# Patient Record
Sex: FEMALE | Race: BLACK OR AFRICAN AMERICAN | Employment: STUDENT | URBAN - METROPOLITAN AREA
[De-identification: names, ages, dates, MRNs, and addresses within clinical notes are randomized per-mention and may not be internally consistent; named-entity substitution may affect disease eponyms.]

---

## 2017-03-24 DIAGNOSIS — N94.6 DYSMENORRHEA: ICD-10-CM

## 2017-03-24 RX ORDER — NORETHINDRONE ACETATE AND ETHINYL ESTRADIOL .02; 1 MG/1; MG/1
1 TABLET ORAL DAILY
Qty: 84 TABLET | Refills: 0 | Status: SHIPPED | OUTPATIENT
Start: 2017-03-24 | End: 2017-08-07

## 2017-03-24 NOTE — TELEPHONE ENCOUNTER
[facility] calling for med refill of Microgestin.  Looks like changed primary to [facility]?  One refill given and note needs yearly visit prior to refills.  Alejandra Wharton RN

## 2017-03-25 NOTE — TELEPHONE ENCOUNTER
Pharmacy calling for refill.  Due for visit prior to further refills.  Note on RX.  Alejandra Wharton RN

## 2017-07-03 ENCOUNTER — OFFICE VISIT (OUTPATIENT)
Dept: FAMILY MEDICINE | Facility: CLINIC | Age: 22
End: 2017-07-03

## 2017-07-03 VITALS
HEIGHT: 67 IN | SYSTOLIC BLOOD PRESSURE: 100 MMHG | DIASTOLIC BLOOD PRESSURE: 70 MMHG | WEIGHT: 147.6 LBS | OXYGEN SATURATION: 98 % | BODY MASS INDEX: 23.17 KG/M2 | HEART RATE: 89 BPM | TEMPERATURE: 98.4 F

## 2017-07-03 DIAGNOSIS — N89.8 VAGINAL ITCHING: Primary | ICD-10-CM

## 2017-07-03 DIAGNOSIS — Z71.89 ACP (ADVANCE CARE PLANNING): ICD-10-CM

## 2017-07-03 LAB
BACTERIA URINE: ABNORMAL
CLUE CELLS: ABNORMAL
PH BLDA: 5 [PH] (ref 5–7)
PMNS (WET PREP): ABNORMAL
TRICHOMONAS VAGINALS: ABNORMAL
YEAST CELLS: ABNORMAL

## 2017-07-03 PROCEDURE — 99213 OFFICE O/P EST LOW 20 MIN: CPT | Performed by: PHYSICIAN ASSISTANT

## 2017-07-03 PROCEDURE — 87210 SMEAR WET MOUNT SALINE/INK: CPT | Performed by: PHYSICIAN ASSISTANT

## 2017-07-03 PROCEDURE — 87491 CHLMYD TRACH DNA AMP PROBE: CPT | Mod: 90 | Performed by: PHYSICIAN ASSISTANT

## 2017-07-03 PROCEDURE — 87591 N.GONORRHOEAE DNA AMP PROB: CPT | Mod: 90 | Performed by: PHYSICIAN ASSISTANT

## 2017-07-03 NOTE — MR AVS SNAPSHOT
"              After Visit Summary   7/3/2017    Harriet Valencia    MRN: [id number]           Patient Information     Date Of Birth          1995        Visit Information        Provider Department      7/3/2017 2:30 PM Yadi Hook PA-C [facility]        Today's Diagnoses     Vaginal itching    -  1    ACP (advance care planning)           Follow-ups after your visit        Follow-up notes from your care team     Return if symptoms worsen or fail to improve.      Who to contact     If you have questions or need follow up information about today's clinic visit or your schedule please contact [facility] directly at [phone].  Normal or non-critical lab and imaging results will be communicated to you by MyChart, letter or phone within 4 business days after the clinic has received the results. If you do not hear from us within 7 days, please contact the clinic through Payvmenthart or phone. If you have a critical or abnormal lab result, we will notify you by phone as soon as possible.  Submit refill requests through [employer] or call your pharmacy and they will forward the refill request to us. Please allow 3 business days for your refill to be completed.          Additional Information About Your Visit        MyChart Information     [employer] gives you secure access to your electronic health record. If you see a primary care provider, you can also send messages to your care team and make appointments. If you have questions, please call your primary care clinic.  If you do not have a primary care provider, please call [phone] and they will assist you.        Care EveryWhere ID     This is your Care EveryWhere ID. This could be used by other organizations to access your [place] medical records  [id number]        Your Vitals Were     Pulse Temperature Height Last Period Pulse Oximetry Breastfeeding?    89 98.4  F (36.9  C) (Oral) 1.689 m (5' 6.5\") 06/12/2017 " 98% No    BMI (Body Mass Index)                   23.47 kg/m2            Blood Pressure from Last 3 Encounters:   07/03/17 100/70   08/01/16 110/70   06/25/16 102/60    Weight from Last 3 Encounters:   07/03/17 67 kg (147 lb 9.6 oz)   08/01/16 64 kg (141 lb)   06/25/16 65.2 kg (143 lb 12.8 oz)              We Performed the Following     A WET PREP (BFP)     Chlam Trach/N. Gonorrhoeae RNA TMA([facility]        Primary Care Provider Office Phone # Fax #    SOTO Soares [phone]       [facility]  [address]  [facility] [address]        Equal Access to Services     JENNA CHILDERS : Hadii renata randall hadasho Soomaali, waaxda luqadaha, qaybta kaalmada adeegyada, darvin san . So [facility] [place] [phone].    ATENCIÓN: Si habla español, tiene a amador disposición servicios gratuitos de asistencia lingüística. Llame al [phone].    We comply with applicable federal civil rights laws and [place] laws. We do not discriminate on the basis of race, color, national origin, age, disability sex, sexual orientation or gender identity.            Thank you!     Thank you for choosing [facility]  for your care. Our goal is always to provide you with excellent care. Hearing back from our patients is one way we can continue to improve our services. Please take a few minutes to complete the written survey that you may receive in the mail after your visit with us. Thank you!             Your Updated Medication List - Protect others around you: Learn how to safely use, store and throw away your medicines at www.disposemymeds.org.          This list is accurate as of: 7/3/17  4:10 PM.  Always use your most recent med list.                   Brand Name Dispense Instructions for use Diagnosis    norethindrone-ethinyl estradiol 1-20 MG-MCG per tablet    MICROGESTIN 1/20    84 tablet    Take 1 tablet by mouth daily 3/24/17-Yearly visit before next refill     Dysmenorrhea

## 2017-07-03 NOTE — NURSING NOTE
Harriet is here for yeast inf    Pre-Visit Screening :  Immunizations : up to date  Colonoscopy : NA  Mammogram : NA  Asthma Action Test/Plan : NA  PHQ9/GAD7 :  NA  Pulse - regular  My Chart - accepts    CLASSIFICATION OF OVERWEIGHT AND OBESITY BY BMI                         Obesity Class           BMI(kg/m2)  Underweight                                    < 18.5  Normal                                         18.5-24.9  Overweight                                     25.0-29.9  OBESITY                     I                  30.0-34.9                              II                 35.0-39.9  EXTREME OBESITY             III                >40                             Patient's  BMI Body mass index is 23.47 kg/(m^2).  http://hin.nhlbi.nih.gov/menuplanner/menu.cgi  Questioned patient about current smoking habits.  Pt. has never smoked.

## 2017-07-03 NOTE — PROGRESS NOTES
"CC: Vaginal Symptoms    History:  Harriet is here today with concerns over several recent yeast infections.    She first had an episode 1 year ago after a course of penicillin, which responded well to Monistat. Unfortunately, she had another episode this past May, which also responded well to Monistat. Now, just in the past 4-5 days, Harriet developed her typical symptoms again where she had white discharge and vaginal itching. She used another Monistat 1 day treatment, which helped at first, but now this morning had another episode of itching and discharge. She is feeling okay right now, but is worried treatment was inadequate.    Harriet has not had this evaluated yet. Would like to know what is causing it. Has had new sexual partners since her last STD check.    PMH, MEDICATIONS, ALLERGIES, SOCIAL AND FAMILY HISTORY in EPIC and reviewed by me personally.      ROS negative other than the symptoms noted above in the HPI.        Examination   /70 (BP Location: Right arm, Patient Position: Chair, Cuff Size: Adult Regular)  Pulse 89  Temp 98.4  F (36.9  C) (Oral)  Ht 1.689 m (5' 6.5\")  Wt 67 kg (147 lb 9.6 oz)  LMP 06/12/2017  SpO2 98%  Breastfeeding? No  BMI 23.47 kg/m2       Constitutional: Sitting comfortably, in no acute distress. Vital signs noted  Cardiovascular:  regular rate and rhythm, no murmurs, clicks, or gallops  Respiratory:  normal respiratory rate and rhythm, lungs clear to auscultation  : External genitalia without abnormalities. Vaginal canal with moderate white discharge. Cervix intact without abnormalities. No abnormalities on bimanual examination.   SKIN: No jaundice/pallor/rash.   Psychiatric: mentation appears normal and affect normal/bright        A/P    ICD-10-CM    1. Vaginal itching L29.8 A WET PREP (BFP)     Chlam Trach/N. Gonorrhoeae RNA TMA([facility]   2. ACP (advance care planning) Z71.89        DISCUSSION: Wet prep shows bacteria, but no clue cells, and no yeast. Will have her " leave dirty urine sample to check for G/C, and will contact her with result once available. I would like for her to monitor her symptoms, and she may have had a yeast infection that she treated with Monistat, and now there is no yeast on sample. Recommended avoiding excess sugar, wearing cotton underwear, and breathable fabrics, showering immediately after working out. I will contact with further recommendations once results return.    follow up visit: As needed    Yadi Hook PA-C  [facility]

## 2017-07-05 ENCOUNTER — MYC MEDICAL ADVICE (OUTPATIENT)
Dept: FAMILY MEDICINE | Facility: CLINIC | Age: 22
End: 2017-07-05

## 2017-07-05 LAB
CHLAMYDIA TRACHOMATIS RNA, TMA - QUEST: NOT DETECTED
NEISSERIA GONORRHOEAE RNA TMA: NOT DETECTED
SEE NOTE - QUEST: NORMAL

## 2017-07-06 NOTE — TELEPHONE ENCOUNTER
From: Harriet Valencia  To: Yadi Hook PA-C  Sent: 7/5/2017 10:58 PM CDT  Subject: Updates about my health    I haven't had any further symptoms since the morning of the visit. Like you said maybe it could've been because of the infection I had treated a couple days prior.

## 2017-07-07 ENCOUNTER — MYC MEDICAL ADVICE (OUTPATIENT)
Dept: FAMILY MEDICINE | Facility: CLINIC | Age: 22
End: 2017-07-07

## 2017-08-07 DIAGNOSIS — N94.6 DYSMENORRHEA: ICD-10-CM

## 2017-08-07 RX ORDER — NORETHINDRONE ACETATE AND ETHINYL ESTRADIOL .02; 1 MG/1; MG/1
1 TABLET ORAL DAILY
Qty: 28 TABLET | Refills: 0 | Status: SHIPPED | OUTPATIENT
Start: 2017-08-07 | End: 2017-08-14

## 2017-08-07 NOTE — TELEPHONE ENCOUNTER
Harriet Valencia is requesting a refill of:    Pending Prescriptions:                       Disp   Refills    norethindrone-ethinyl estradiol (MICROGES*28 tab*0            Sig: Take 1 tablet by mouth daily    Pt has OV on 8/14/17 with you.  Please advise

## 2017-08-14 ENCOUNTER — OFFICE VISIT (OUTPATIENT)
Dept: FAMILY MEDICINE | Facility: CLINIC | Age: 22
End: 2017-08-14

## 2017-08-14 VITALS
DIASTOLIC BLOOD PRESSURE: 60 MMHG | TEMPERATURE: 98.5 F | OXYGEN SATURATION: 99 % | HEIGHT: 66 IN | HEART RATE: 83 BPM | BODY MASS INDEX: 23.78 KG/M2 | WEIGHT: 148 LBS | SYSTOLIC BLOOD PRESSURE: 98 MMHG

## 2017-08-14 DIAGNOSIS — Z30.41 SURVEILLANCE OF PREVIOUSLY PRESCRIBED CONTRACEPTIVE PILL: ICD-10-CM

## 2017-08-14 DIAGNOSIS — Z01.419 ENCOUNTER FOR GYNECOLOGICAL EXAMINATION WITHOUT ABNORMAL FINDING: Primary | ICD-10-CM

## 2017-08-14 DIAGNOSIS — Z23 NEED FOR TDAP VACCINATION: ICD-10-CM

## 2017-08-14 LAB — HEMOGLOBIN: 13.6 G/DL (ref 11.7–15.7)

## 2017-08-14 PROCEDURE — 85018 HEMOGLOBIN: CPT | Performed by: PHYSICIAN ASSISTANT

## 2017-08-14 PROCEDURE — 36415 COLL VENOUS BLD VENIPUNCTURE: CPT | Performed by: PHYSICIAN ASSISTANT

## 2017-08-14 PROCEDURE — 99395 PREV VISIT EST AGE 18-39: CPT | Mod: 25 | Performed by: PHYSICIAN ASSISTANT

## 2017-08-14 PROCEDURE — 90471 IMMUNIZATION ADMIN: CPT | Performed by: PHYSICIAN ASSISTANT

## 2017-08-14 PROCEDURE — 90715 TDAP VACCINE 7 YRS/> IM: CPT | Performed by: PHYSICIAN ASSISTANT

## 2017-08-14 RX ORDER — NORETHINDRONE ACETATE AND ETHINYL ESTRADIOL .02; 1 MG/1; MG/1
1 TABLET ORAL DAILY
Qty: 84 TABLET | Refills: 3 | Status: SHIPPED | OUTPATIENT
Start: 2017-08-14 | End: 2018-10-29

## 2017-08-14 NOTE — NURSING NOTE
"Harriet Valencia is here for a CPX. Non- Fasting: .    Pre-visit Planning  Immunizations -not up to date - Tdap  Colonoscopy -NA  Mammogram -NA  Asthma test --NA  PHQ2 -is completed today  GIAN 7 -NA  Fall Risk Assessment -NA  CAGE: completed today  Vitals:  BP Cuff right  Arm with regular Cuff  PULSE regular  148 lbs 0 oz and 5' 6.25\"  CLASSIFICATION OF OVERWEIGHT AND OBESITY BY BMI                        Obesity Class           BMI(kg/m2)  Underweight                                    < 18.5  Normal                                         18.5-24.9  Overweight                                     25.0-29.9  OBESITY                     I                  30.0-34.9                             II                 35.0-39.9  EXTREME OBESITY             III                >40      Patient's  BMI Body mass index is 23.71 kg/(m^2).  Http://hin.nhlbi.nih.gov/menuplanner/menu.cgi      Roomed By: VALERIE Moore ([facility])    "

## 2017-08-14 NOTE — PROGRESS NOTES
SUBJECTIVE:     CC: Harriet Valencia is an 21 year old woman who presents for preventive health visit.     Healthy Habits:      Amount of exercise or daily activities, outside of work: gym daily    Problems taking medications regularly No    Medication side effects: No    Have you had an eye exam in the pasLt two years? yes    Do you see a dentist twice per year? yes      OCP use:   Do you or anyone in your family have a history of blood clots? No  Do you have a history of migraine with aura?  No  Do you smoke?  No  Do you have a history of hypertension?  No          Today's PHQ-2 Score:   PHQ-2 ( 1999 Pfizer) 8/14/2017 5/12/2016   Q1: Little interest or pleasure in doing things 0 0   Q2: Feeling down, depressed or hopeless 0 0   PHQ-2 Score 0 0         Abuse: Current or Past(Physical, Sexual or Emotional)- No  Do you feel safe in your environment - Yes  Social History   Substance Use Topics     Smoking status: Never Smoker     Smokeless tobacco: Never Used     Alcohol use Yes      Comment: rare     The patient does not drink >3 drinks per day nor >7 drinks per week.    No results for input(s): CHOL, HDL, LDL, TRIG, CHOLHDLRATIO, NHDL in the last 30080 hours.      Reviewed orders with patient.  Reviewed health maintenance and updated orders accordingly - Yes  Labs reviewed in EPIC  BP Readings from Last 3 Encounters:   08/14/17 98/60   07/03/17 100/70   08/01/16 110/70    Wt Readings from Last 3 Encounters:   08/14/17 67.1 kg (148 lb)   07/03/17 67 kg (147 lb 9.6 oz)   08/01/16 64 kg (141 lb)                  Patient Active Problem List   Diagnosis     Health Care Home     ACP (advance care planning)     Past Surgical History:   Procedure Laterality Date     NO HISTORY OF SURGERY         Social History   Substance Use Topics     Smoking status: Never Smoker     Smokeless tobacco: Never Used     Alcohol use Yes      Comment: rare     Family History   Problem Relation Age of Onset     Family History Negative Mother       Family History Negative Father      DIABETES Paternal Grandmother      DIABETES Paternal Grandfather      Family History Negative Brother      CANCER No family hx of      Breast Cancer No family hx of      Colon Cancer No family hx of          Current Outpatient Prescriptions   Medication Sig Dispense Refill     norethindrone-ethinyl estradiol (MICROGESTIN 1/20) 1-20 MG-MCG per tablet Take 1 tablet by mouth daily 84 tablet 3     [DISCONTINUED] norethindrone-ethinyl estradiol (MICROGESTIN 1/20) 1-20 MG-MCG per tablet Take 1 tablet by mouth daily 28 tablet 0     Allergies   Allergen Reactions     No Known Allergies                Mammo Decision Support:  Mammogram not appropriate for this patient based on age.    Pertinent mammograms are reviewed under the imaging tab.  History of abnormal Pap smear: NO - age 21-29 PAP every 3 years recommended  All Histories reviewed and updated in Epic.  Past Medical History:   Diagnosis Date     NO ACTIVE PROBLEMS       Past Surgical History:   Procedure Laterality Date     NO HISTORY OF SURGERY         ROS:  C: NEGATIVE for fever, chills, change in weight  I: NEGATIVE for worrisome rashes, moles or lesions  E: NEGATIVE for vision changes or irritation  ENT: NEGATIVE for ear, mouth and throat problems  R: NEGATIVE for significant cough or SOB  B: NEGATIVE for masses, tenderness or discharge  CV: NEGATIVE for chest pain, palpitations or peripheral edema  GI: NEGATIVE for nausea, abdominal pain, heartburn, or change in bowel habits  : NEGATIVE for unusual urinary or vaginal symptoms. Periods are regular.   M: NEGATIVE for significant arthralgias or myalgia  N: NEGATIVE for weakness, dizziness or paresthesias  P: NEGATIVE for changes in mood or affect    Problem list, Medication list, Allergies, and Medical/Social/Surgical histories reviewed in [facility] and updated as appropriate.  Labs reviewed in EPIC  BP Readings from Last 3 Encounters:   08/14/17 98/60   07/03/17 100/70   08/01/16  "110/70    Wt Readings from Last 3 Encounters:   08/14/17 67.1 kg (148 lb)   07/03/17 67 kg (147 lb 9.6 oz)   08/01/16 64 kg (141 lb)                  Patient Active Problem List   Diagnosis     Health Care Home     ACP (advance care planning)     Past Surgical History:   Procedure Laterality Date     NO HISTORY OF SURGERY         Social History   Substance Use Topics     Smoking status: Never Smoker     Smokeless tobacco: Never Used     Alcohol use Yes      Comment: rare     Family History   Problem Relation Age of Onset     Family History Negative Mother      Family History Negative Father      DIABETES Paternal Grandmother      DIABETES Paternal Grandfather      Family History Negative Brother      CANCER No family hx of      Breast Cancer No family hx of      Colon Cancer No family hx of          Current Outpatient Prescriptions   Medication Sig Dispense Refill     norethindrone-ethinyl estradiol (MICROGESTIN 1/20) 1-20 MG-MCG per tablet Take 1 tablet by mouth daily 84 tablet 3     [DISCONTINUED] norethindrone-ethinyl estradiol (MICROGESTIN 1/20) 1-20 MG-MCG per tablet Take 1 tablet by mouth daily 28 tablet 0     Allergies   Allergen Reactions     No Known Allergies      No lab results found.   OBJECTIVE:     BP 98/60 (BP Location: Right arm, Patient Position: Chair, Cuff Size: Adult Regular)  Pulse 83  Temp 98.5  F (36.9  C) (Oral)  Ht 1.683 m (5' 6.25\")  Wt 67.1 kg (148 lb)  LMP 07/17/2017  SpO2 99%  Breastfeeding? No  BMI 23.71 kg/m2  EXAM:  GENERAL: healthy, alert and no distress  EYES: Eyes grossly normal to inspection, PERRL and conjunctivae and sclerae normal  HENT: ear canals and TM's normal, nose and mouth without ulcers or lesions  NECK: no adenopathy, no asymmetry, masses, or scars and thyroid normal to palpation  RESP: lungs clear to auscultation - no rales, rhonchi or wheezes  BREAST: normal without masses, tenderness or nipple discharge and no palpable axillary masses or adenopathy  CV: " "regular rate and rhythm, normal S1 S2, no S3 or S4, no murmur, click or rub, no peripheral edema and peripheral pulses strong  ABDOMEN: soft, nontender, no hepatosplenomegaly, no masses and bowel sounds normal   (female): normal female external genitalia, normal urethral meatus, vaginal mucosa pink, moist, well rugated, and normal cervix/adnexa/uterus without masses or discharge   MS: no gross musculoskeletal defects noted, no edema  SKIN: no suspicious lesions or rashes  NEURO: Normal strength and tone, mentation intact and speech normal  PSYCH: mentation appears normal, affect normal/bright    ASSESSMENT/PLAN:         ICD-10-CM    1. Encounter for gynecological examination without abnormal finding Z01.419 VENOUS COLLECTION     HEMOGLOBIN   2. Surveillance of previously prescribed contraceptive pill Z30.41 norethindrone-ethinyl estradiol (MICROGESTIN 1/20) 1-20 MG-MCG per tablet   3. Need for Tdap vaccination Z23        COUNSELING:     Special attention given to:        Regular exercise       Healthy diet/nutrition       Vision screening       Hearing screening       Immunizations    Vaccinated for: TDAP                   Safe sex practices/STD prevention      Blood Pressure.  BP Readings from Last 6 Encounters:   08/14/17 98/60   07/03/17 100/70   08/01/16 110/70   06/25/16 102/60   05/12/16 100/60   12/29/15 114/60            reports that she has never smoked. She has never used smokeless tobacco.    Estimated body mass index is 23.71 kg/(m^2) as calculated from the following:    Height as of this encounter: 1.683 m (5' 6.25\").    Weight as of this encounter: 67.1 kg (148 lb).         Counseling Resources:  ATP IV Guidelines  Pooled Cohorts Equation Calculator  Breast Cancer Risk Calculator  FRAX Risk Assessment  ICSI Preventive Guidelines  Dietary Guidelines for Americans, 2010  USDA's MyPlate  ASA Prophylaxis  Lung CA Screening    SOTO Soares  [facility]  "

## 2017-08-14 NOTE — MR AVS SNAPSHOT
After Visit Summary   8/14/2017    Harriet Valencia    MRN: [id number]           Patient Information     Date Of Birth          1995        Visit Information        Provider Department      8/14/2017 11:30 AM Roxanne Alcantar PA [facility]        Today's Diagnoses     Encounter for gynecological examination without abnormal finding    -  1    Surveillance of previously prescribed contraceptive pill        Need for Tdap vaccination           Follow-ups after your visit        Who to contact     If you have questions or need follow up information about today's clinic visit or your schedule please contact [facility] directly at [phone].  Normal or non-critical lab and imaging results will be communicated to you by MyChart, letter or phone within 4 business days after the clinic has received the results. If you do not hear from us within 7 days, please contact the clinic through Biomemehart or phone. If you have a critical or abnormal lab result, we will notify you by phone as soon as possible.  Submit refill requests through [email or web address] or call your pharmacy and they will forward the refill request to us. Please allow 3 business days for your refill to be completed.          Additional Information About Your Visit        MyChart Information     [email or web address] gives you secure access to your electronic health record. If you see a primary care provider, you can also send messages to your care team and make appointments. If you have questions, please call your primary care clinic.  If you do not have a primary care provider, please call [phone] and they will assist you.        Care EveryWhere ID     This is your Care EveryWhere ID. This could be used by other organizations to access your [place] medical records  [id number]        Your Vitals Were     Pulse Temperature Height Last Period Pulse Oximetry Breastfeeding?    83 98.5  F (36.9  C) (Oral)  "1.683 m (5' 6.25\") 07/17/2017 99% No    BMI (Body Mass Index)                   23.71 kg/m2            Blood Pressure from Last 3 Encounters:   08/14/17 98/60   07/03/17 100/70   08/01/16 110/70    Weight from Last 3 Encounters:   08/14/17 67.1 kg (148 lb)   07/03/17 67 kg (147 lb 9.6 oz)   08/01/16 64 kg (141 lb)              We Performed the Following     HEMOGLOBIN     TDAP VACCINE (BOOSTRIX)     VACCINE ADMINISTRATION, INITIAL     VENOUS COLLECTION          Where to get your medicines      [facility] [address] [place] [address] [id number] [place] [address] AT [place] [address]  [id number] [place] [id number]    Hours:  24-hours Phone:  [phone]     norethindrone-ethinyl estradiol 1-20 MG-MCG per tablet          Primary Care Provider Office Phone # Fax #    SOTO Soares [phone] [address] [facility] [address]        Equal Access to Services     [facility] : Hadii renata randall hadjeniseo Sodunia, waaxda luqadaha, qaybta kaalmada adelopezyada, darvin san . So [facility] [phone].    ATENCIÓN: Si habla español, tiene a amador disposición servicios gratuitos de asistencia lingüística. LlSt. [facility] Hospital [phone].    We comply with applicable federal civil rights laws and [place] laws. We do not discriminate on the basis of race, color, national origin, age, disability sex, sexual orientation or gender identity.            Thank you!     Thank you for choosing [facility]  for your care. Our goal is always to provide you with excellent care. Hearing back from our patients is one way we can continue to improve our services. Please take a few minutes to complete the written survey that you may receive in the mail after your visit with us. Thank you!             Your Updated Medication List - Protect others around you: Learn how to safely use, store and throw away your medicines at " www.disposemymeds.org.          This list is accurate as of: 8/14/17  1:30 PM.  Always use your most recent med list.                   Brand Name Dispense Instructions for use Diagnosis    norethindrone-ethinyl estradiol 1-20 MG-MCG per tablet    MICROGESTIN 1/20    84 tablet    Take 1 tablet by mouth daily    Surveillance of previously prescribed contraceptive pill

## 2017-09-05 ENCOUNTER — TELEPHONE (OUTPATIENT)
Dept: FAMILY MEDICINE | Facility: CLINIC | Age: 22
End: 2017-09-05

## 2017-09-05 NOTE — TELEPHONE ENCOUNTER
Pt called the CS line with a  Questions about her Birth control medication.   She wanted to make sure she has a full year prescription because when she went to  her medication they only gave her 1 month.   Called the pharmacy and they do have her prescription for 1 full her on profile.     Called Harriet and let her know that she does have a full year prescription at her current pharmacy.     Lorri.VALERIE Rios ([facility])

## 2018-11-21 NOTE — PROGRESS NOTES
"     SUBJECTIVE:   CC: Harriet Valencia is an 22 year old woman who presents for preventive health visit.     Healthy Habits:    Do you get at least three servings of calcium containing foods daily (dairy, green leafy vegetables, etc.)? yes    Amount of exercise or daily activities, outside of work: working out 4-5 x a week    Problems taking medications regularly No    Medication side effects: No    Have you had an eye exam in the past two years? yes    Do you see a dentist twice per year? yes    OCP use:   Do you or anyone in your family have a history of blood clots? No  Do you have a history of migraine with aura?  No  Do you smoke?  No  Do you have a history of hypertension?  Yes    Recurrent \"yeast infections\" since Amoxicillin  No hx BV or STDs  One partner, using condoms          Today's PHQ-2 Score:   PHQ-2 ( 1999 Pfizer) 11/23/2018 8/14/2017   Q1: Little interest or pleasure in doing things 0 0   Q2: Feeling down, depressed or hopeless 0 0   PHQ-2 Score 0 0       Abuse: Current or Past(Physical, Sexual or Emotional)- No  Do you feel safe in your environment - Yes    Social History   Substance Use Topics     Smoking status: Never Smoker     Smokeless tobacco: Never Used     Alcohol use Yes      Comment: rare     If you drink alcohol do you typically have >3 drinks per day or >7 drinks per week? No                     Reviewed orders with patient.  Reviewed health maintenance and updated orders accordingly - Yes  Labs reviewed in EPIC  BP Readings from Last 3 Encounters:   11/23/18 100/70   08/14/17 98/60   07/03/17 100/70    Wt Readings from Last 3 Encounters:   11/23/18 66.2 kg (146 lb)   08/14/17 67.1 kg (148 lb)   07/03/17 67 kg (147 lb 9.6 oz)                  Patient Active Problem List   Diagnosis     Health Care Home     ACP (advance care planning)     Past Surgical History:   Procedure Laterality Date     NO HISTORY OF SURGERY         Social History   Substance Use Topics     Smoking status: Never " Smoker     Smokeless tobacco: Never Used     Alcohol use Yes      Comment: rare     Family History   Problem Relation Age of Onset     Family History Negative Mother      Family History Negative Father      Diabetes Paternal Grandmother      Diabetes Paternal Grandfather      Family History Negative Brother      Cancer No family hx of      Breast Cancer No family hx of      Colon Cancer No family hx of          Current Outpatient Prescriptions   Medication Sig Dispense Refill     metroNIDAZOLE (FLAGYL) 500 MG tablet Take 1 tablet (500 mg) by mouth 2 times daily 14 tablet 0     norethindrone-ethinyl estradiol (JUNEL 1/20) 1-20 MG-MCG per tablet Take 1 tablet by mouth daily 84 tablet 3     [DISCONTINUED] JUNEL 1/20 1-20 MG-MCG per tablet TAKE 1 TABLET BY MOUTH EVERY DAY 84 tablet 0     Allergies   Allergen Reactions     No Known Allergies      No lab results found.     Mammogram not appropriate for this patient based on age.    Pertinent mammograms are reviewed under the imaging tab.  History of abnormal Pap smear: NO - age 21-29 PAP every 3 years recommended  PAP / HPV 5/12/2016   PAP NIL     Reviewed and updated as needed this visit by clinical staff  Tobacco  Allergies  Problems         Reviewed and updated as needed this visit by Provider        Past Medical History:   Diagnosis Date     NO ACTIVE PROBLEMS       Past Surgical History:   Procedure Laterality Date     NO HISTORY OF SURGERY         ROS:  CONSTITUTIONAL: NEGATIVE for fever, chills, change in weight  INTEGUMENTARU/SKIN: NEGATIVE for worrisome rashes, moles or lesions  EYES: NEGATIVE for vision changes or irritation  ENT: NEGATIVE for ear, mouth and throat problems  RESP: NEGATIVE for significant cough or SOB  BREAST: NEGATIVE for masses, tenderness or discharge  CV: NEGATIVE for chest pain, palpitations or peripheral edema  GI: NEGATIVE for nausea, abdominal pain, heartburn, or change in bowel habits  : NEGATIVE for unusual urinary or vaginal  "symptoms. Periods are regular.  MUSCULOSKELETAL: NEGATIVE for significant arthralgias or myalgia  NEURO: NEGATIVE for weakness, dizziness or paresthesias  PSYCHIATRIC: NEGATIVE for changes in mood or affect    OBJECTIVE:   /70 (BP Location: Right arm, Patient Position: Sitting, Cuff Size: Adult Regular)  Pulse 64  Temp 98.7  F (37.1  C) (Oral)  Ht 1.676 m (5' 6\")  Wt 66.2 kg (146 lb)  LMP 11/12/2018  SpO2 97%  BMI 23.57 kg/m2     EXAM:  GENERAL: healthy, alert and no distress  EYES: Eyes grossly normal to inspection, PERRL and conjunctivae and sclerae normal  HENT: ear canals and TM's normal, nose and mouth without ulcers or lesions  NECK: no adenopathy, no asymmetry, masses, or scars and thyroid normal to palpation  RESP: lungs clear to auscultation - no rales, rhonchi or wheezes  BREAST: normal without masses, tenderness or nipple discharge and no palpable axillary masses or adenopathy  CV: regular rate and rhythm, normal S1 S2, no S3 or S4, no murmur, click or rub, no peripheral edema and peripheral pulses strong  ABDOMEN: soft, nontender, no hepatosplenomegaly, no masses and bowel sounds normal   (female): normal female external genitalia, normal urethral meatus, vaginal mucosa pink, moist, well rugated, and normal cervix/adnexa/uterus without masses  + thin white discharge, foul odor  MS: no gross musculoskeletal defects noted, no edema  SKIN: no suspicious lesions or rashes  NEURO: Normal strength and tone, mentation intact and speech normal  PSYCH: mentation appears normal, affect normal/bright    Diagnostic Test Results:  Results for orders placed or performed in visit on 11/23/18 (from the past 24 hour(s))   A WET PREP (BFP)   Result Value Ref Range    Trichomonas Vaginals neg     yeast cells neg     PMNs Wet Prep LARGE (A)     Clue cells POS (A)     Bacteria (Wet Prep) LARGE (A)     pH Fluid Source 6.0 (A) 3.5 - 4.5       ASSESSMENT/PLAN:   (Z01.411) Encounter for gynecological examination " "with abnormal finding  (primary encounter diagnosis)  Comment: annual  Plan: VENOUS COLLECTION, Lipid Profile, Glucose         Fasting (BFP), HEMOGRAM/PLATELET            (Z30.41) Surveillance of previously prescribed contraceptive pill  Comment: stable  Plan: norethindrone-ethinyl estradiol (JUNEL 1/20)         1-20 MG-MCG per tablet            (Z11.3) Screen for STD (sexually transmitted disease)  Comment: screening  Plan: RPR W/REFLEX TITER & CONFIRM, Chlam Trach/N.         Gonorrhoeae RNA TMA([facility], HIV 1/2 Agn Mitzy 4th         Gen w Reflex ([facility]), VENOUS COLLECTION            (N76.0,  B96.89) BV (bacterial vaginosis)  Comment: new  Plan: metroNIDAZOLE (FLAGYL) 500 MG tablet        Metronidazole for BV. Advised AE of metronidazole including nausea, vomiting, stomach upset and diarrhea  Advised absolute abstinence from alcohol and sexual intercourse while on this medication.    I reviewed the patient information handout from Up To Date on this medication including side effects with the patient.      (Z23) Need for vaccination  Comment: declines influenza vaccine    COUNSELING:   Special attention given to:        Regular exercise       Healthy diet/nutrition       Contraception       Safe sex practices/STD prevention    BP Readings from Last 1 Encounters:   11/23/18 100/70     Estimated body mass index is 23.57 kg/(m^2) as calculated from the following:    Height as of this encounter: 1.676 m (5' 6\").    Weight as of this encounter: 66.2 kg (146 lb).           reports that she has never smoked. She has never used smokeless tobacco.      Counseling Resources:  ATP IV Guidelines  Pooled Cohorts Equation Calculator  Breast Cancer Risk Calculator  FRAX Risk Assessment  ICSI Preventive Guidelines  Dietary Guidelines for Americans, 2010  USDA's MyPlate  ASA Prophylaxis  Lung CA Screening    SOTO Soares  [facility]  "

## 2018-11-23 ENCOUNTER — OFFICE VISIT (OUTPATIENT)
Dept: FAMILY MEDICINE | Facility: CLINIC | Age: 23
End: 2018-11-23

## 2018-11-23 VITALS
HEIGHT: 66 IN | HEART RATE: 64 BPM | SYSTOLIC BLOOD PRESSURE: 100 MMHG | WEIGHT: 146 LBS | DIASTOLIC BLOOD PRESSURE: 70 MMHG | BODY MASS INDEX: 23.46 KG/M2 | OXYGEN SATURATION: 97 % | TEMPERATURE: 98.7 F

## 2018-11-23 DIAGNOSIS — Z30.41 SURVEILLANCE OF PREVIOUSLY PRESCRIBED CONTRACEPTIVE PILL: ICD-10-CM

## 2018-11-23 DIAGNOSIS — Z01.411 ENCOUNTER FOR GYNECOLOGICAL EXAMINATION WITH ABNORMAL FINDING: Primary | ICD-10-CM

## 2018-11-23 DIAGNOSIS — Z23 NEED FOR VACCINATION: ICD-10-CM

## 2018-11-23 DIAGNOSIS — Z11.3 SCREEN FOR STD (SEXUALLY TRANSMITTED DISEASE): ICD-10-CM

## 2018-11-23 DIAGNOSIS — N76.0 BV (BACTERIAL VAGINOSIS): ICD-10-CM

## 2018-11-23 DIAGNOSIS — B96.89 BV (BACTERIAL VAGINOSIS): ICD-10-CM

## 2018-11-23 LAB
BACTERIA (WET PREP): ABNORMAL
CLUE CELLS: ABNORMAL
ERYTHROCYTE [DISTWIDTH] IN BLOOD BY AUTOMATED COUNT: 12.5 %
GLUCOSE SERPL-MCNC: 81 MG/DL (ref 60–99)
HCT VFR BLD AUTO: 38.4 % (ref 35–47)
HEMOGLOBIN: 13 G/DL (ref 11.7–15.7)
MCH RBC QN AUTO: 29.7 PG (ref 26–33)
MCHC RBC AUTO-ENTMCNC: 33.9 G/DL (ref 31–36)
MCV RBC AUTO: 87.8 FL (ref 78–100)
PH FLUID SOURCE: 6 (ref 3.5–4.5)
PLATELET COUNT - QUEST: 290 10^9/L (ref 150–375)
PMNS (WET PREP): ABNORMAL
RBC # BLD AUTO: 4.37 10*12/L (ref 3.8–5.2)
TRICHOMONAS VAGINALS: ABNORMAL
WBC # BLD AUTO: 9.8 10*9/L (ref 4–11)
YEAST CELLS: ABNORMAL

## 2018-11-23 PROCEDURE — 87210 SMEAR WET MOUNT SALINE/INK: CPT | Performed by: PHYSICIAN ASSISTANT

## 2018-11-23 PROCEDURE — 80061 LIPID PANEL: CPT | Mod: 90 | Performed by: PHYSICIAN ASSISTANT

## 2018-11-23 PROCEDURE — 82947 ASSAY GLUCOSE BLOOD QUANT: CPT | Performed by: PHYSICIAN ASSISTANT

## 2018-11-23 PROCEDURE — 36415 COLL VENOUS BLD VENIPUNCTURE: CPT | Performed by: PHYSICIAN ASSISTANT

## 2018-11-23 PROCEDURE — 85027 COMPLETE CBC AUTOMATED: CPT | Performed by: PHYSICIAN ASSISTANT

## 2018-11-23 PROCEDURE — 87491 CHLMYD TRACH DNA AMP PROBE: CPT | Mod: 90 | Performed by: PHYSICIAN ASSISTANT

## 2018-11-23 PROCEDURE — 99395 PREV VISIT EST AGE 18-39: CPT | Performed by: PHYSICIAN ASSISTANT

## 2018-11-23 PROCEDURE — 87591 N.GONORRHOEAE DNA AMP PROB: CPT | Mod: 90 | Performed by: PHYSICIAN ASSISTANT

## 2018-11-23 PROCEDURE — 86592 SYPHILIS TEST NON-TREP QUAL: CPT | Mod: 90 | Performed by: PHYSICIAN ASSISTANT

## 2018-11-23 PROCEDURE — 87389 HIV-1 AG W/HIV-1&-2 AB AG IA: CPT | Mod: 90 | Performed by: PHYSICIAN ASSISTANT

## 2018-11-23 RX ORDER — NORETHINDRONE ACETATE AND ETHINYL ESTRADIOL .02; 1 MG/1; MG/1
1 TABLET ORAL DAILY
Qty: 84 TABLET | Refills: 3 | Status: SHIPPED | OUTPATIENT
Start: 2018-11-23

## 2018-11-23 RX ORDER — METRONIDAZOLE 500 MG/1
500 TABLET ORAL 2 TIMES DAILY
Qty: 14 TABLET | Refills: 0 | Status: SHIPPED | OUTPATIENT
Start: 2018-11-23 | End: 2019-04-19

## 2018-11-23 NOTE — MR AVS SNAPSHOT
After Visit Summary   11/23/2018    Harriet Valencia    MRN: [id number]           Patient Information     Date Of Birth          1995        Visit Information        Provider Department      11/23/2018 9:30 AM Roxanne Alcantar PA [facility]        Today's Diagnoses     Encounter for gynecological examination with abnormal finding    -  1    Surveillance of previously prescribed contraceptive pill        Screen for STD (sexually transmitted disease)        BV (bacterial vaginosis)        Need for vaccination          Care Instructions      Preventive Health Recommendations  Female Ages 21 to 25     Yearly exam:     See your health care provider every year in order to  o Review health changes.   o Discuss preventive care.    o Review your medicines if your doctor has prescribed any.      You should be tested each year for STDs (sexually transmitted diseases).       Talk to your provider about how often you should have cholesterol testing.      Get a Pap test every three years. If you have an abnormal result, your doctor may have you test more often.      If you are at risk for diabetes, you should have a diabetes test (fasting glucose).     Shots:     Get a flu shot each year.     Get a tetanus shot every 10 years.     Consider getting the shot (vaccine) that prevents cervical cancer (Gardasil).    Nutrition:     Eat at least 5 servings of fruits and vegetables each day.    Eat whole-grain bread, whole-wheat pasta and brown rice instead of white grains and rice.    Get adequate Calcium and Vitamin D.     Lifestyle    Exercise at least 150 minutes a week each week (30 minutes a day, 5 days a week). This will help you control your weight and prevent disease.    Limit alcohol to one drink per day.    No smoking.     Wear sunscreen to prevent skin cancer.    See your dentist every six months for an exam and cleaning.          Follow-ups after your visit        Who to  "contact     If you have questions or need follow up information about today's clinic visit or your schedule please contact [facility] directly at [phone].  Normal or non-critical lab and imaging results will be communicated to you by MyChart, letter or phone within 4 business days after the clinic has received the results. If you do not hear from us within 7 days, please contact the clinic through MyChart or phone. If you have a critical or abnormal lab result, we will notify you by phone as soon as possible.  Submit refill requests through [employer] or call your pharmacy and they will forward the refill request to us. Please allow 3 business days for your refill to be completed.          Additional Information About Your Visit        [employer] Information     [employer] gives you secure access to your electronic health record. If you see a primary care provider, you can also send messages to your care team and make appointments. If you have questions, please call your primary care clinic.  If you do not have a primary care provider, please call [phone] and they will assist you.        Care EveryWhere ID     This is your Care EveryWhere ID. This could be used by other organizations to access your [place] medical records  [id number]        Your Vitals Were     Pulse Temperature Height Last Period Pulse Oximetry BMI (Body Mass Index)    64 98.7  F (37.1  C) (Oral) 1.676 m (5' 6\") 11/12/2018 97% 23.57 kg/m2       Blood Pressure from Last 3 Encounters:   11/23/18 100/70   08/14/17 98/60   07/03/17 100/70    Weight from Last 3 Encounters:   11/23/18 66.2 kg (146 lb)   08/14/17 67.1 kg (148 lb)   07/03/17 67 kg (147 lb 9.6 oz)              We Performed the Following     A WET PREP (BFP)     Chlam Trach/N. Gonorrhoeae RNA TMA([facility]     Glucose Fasting (BFP)     HEMOGRAM/PLATELET     HIV 1/2 Agn Mitzy 4th Gen w Reflex ([facility])     Lipid Profile     RPR W/REFLEX TITER & CONFIRM     VENOUS " COLLECTION          Today's Medication Changes          These changes are accurate as of 11/23/18 10:24 AM.  If you have any questions, ask your nurse or doctor.               Start taking these medicines.        Dose/Directions    metroNIDAZOLE 500 MG tablet   Commonly known as:  FLAGYL   Used for:  BV (bacterial vaginosis)   Started by:  Roxanne Alcantar PA        Dose:  500 mg   Take 1 tablet (500 mg) by mouth 2 times daily   Quantity:  14 tablet   Refills:  0         These medicines have changed or have updated prescriptions.        Dose/Directions    norethindrone-ethinyl estradiol 1-20 MG-MCG per tablet   Commonly known as:  JUNEL 1/20   This may have changed:  See the new instructions.   Used for:  Surveillance of previously prescribed contraceptive pill   Changed by:  Roxanne Alcantar PA        Dose:  1 tablet   Take 1 tablet by mouth daily   Quantity:  84 tablet   Refills:  3            Where to get your medicines      These medications were sent to [id number] - [place], [place] - [address] [facility]  [address] [facility] [address]     Phone:  [phone]     norethindrone-ethinyl estradiol 1-20 MG-MCG per tablet         These medications were sent to [facility] [address] [place] [address] [address] AT [facility] [address]  [address] [facility] [address]     Phone:  [phone]     metroNIDAZOLE 500 MG tablet                Primary Care Provider Office Phone # Fax #    SOTO Soares [phone] [address] [facility] [address]        Equal Access to Services     [facility] : Hadii renata ku hadasho Soomaali, waaxda luqadaha, qaybta kaalmada adeegyada, darvin adam. So [facility] [phone].    ATENCIÓN: Si habla español, tiene a amador disposición servicios gratuitos de asistencia lingüística. Adiel al [phone].    We comply  with applicable federal civil rights laws and [place] laws. We do not discriminate on the basis of race, color, national origin, age, disability, sex, sexual orientation, or gender identity.            Thank you!     Thank you for choosing [facility]  for your care. Our goal is always to provide you with excellent care. Hearing back from our patients is one way we can continue to improve our services. Please take a few minutes to complete the written survey that you may receive in the mail after your visit with us. Thank you!             Your Updated Medication List - Protect others around you: Learn how to safely use, store and throw away your medicines at www.disposemymeds.org.          This list is accurate as of 11/23/18 10:24 AM.  Always use your most recent med list.                   Brand Name Dispense Instructions for use Diagnosis    metroNIDAZOLE 500 MG tablet    FLAGYL    14 tablet    Take 1 tablet (500 mg) by mouth 2 times daily    BV (bacterial vaginosis)       norethindrone-ethinyl estradiol 1-20 MG-MCG per tablet    JUNEL 1/20    84 tablet    Take 1 tablet by mouth daily    Surveillance of previously prescribed contraceptive pill

## 2018-11-23 NOTE — NURSING NOTE
Harriet is here for a fasting CPX            Patient is here for a full physical exam.    Pre-Visit Screening :  Immunizations : up to date  Colon Screening : NA  Mammogram: NA  Asthma Action Test/Plan : NA  PHQ9 :  none  GAD7 :  none  Patient's  BMI Body mass index is 23.57 kg/(m^2).  Questioned patient about current smoking habits.  Pt. has never smoked.  OK to leave a detailed voice message regarding today's visit Yes, phone # [phone]      ETOH screening:  Questions:  1-How often do you have a drink containing alcohol?                             2 times per month(s)  2-How many drinks containing alcohol do you have on a typical day when you are         Drinking?                              1   3- How often do you have 5 or more drinks on one occasion?                              1 time per month

## 2018-11-24 LAB
CHLAMYDIA TRACHOMATIS RNA, TMA - QUEST: NOT DETECTED
CHOLEST SERPL-MCNC: 174 MG/DL
CHOLEST/HDLC SERPL: 2.9 (CALC)
HDLC SERPL-MCNC: 59 MG/DL
HIV 1/2 AGN ABY 4TH GEN WITH REFLEX: NORMAL
LDLC SERPL CALC-MCNC: 96 MG/DL (CALC)
NEISSERIA GONORRHOEAE RNA TMA: NOT DETECTED
NONHDLC SERPL-MCNC: 115 MG/DL (CALC)
RPR SCREEN - QUEST: NORMAL
SEE NOTE - QUEST: NORMAL
TRIGL SERPL-MCNC: 101 MG/DL

## 2019-04-03 ENCOUNTER — MYC MEDICAL ADVICE (OUTPATIENT)
Dept: FAMILY MEDICINE | Facility: CLINIC | Age: 24
End: 2019-04-03

## 2019-04-03 NOTE — TELEPHONE ENCOUNTER
From: Harriet Valencia  To: Roxanne Alcantar PA  Sent: 4/3/2019 8:29 AM CDT  Subject: Do I need an appointment?    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 At my job it is necessary to have a doctor fill out a request form in order for me to get a standing desk and I was wondering what steps I need to take in order for you to do that for me?   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 I would love to have the option to stand. Personal from my company sent me a form I'd need you to fill out. Please let me know if that is something you can do.    Thank you!

## 2019-04-19 ENCOUNTER — OFFICE VISIT (OUTPATIENT)
Dept: FAMILY MEDICINE | Facility: CLINIC | Age: 24
End: 2019-04-19

## 2019-04-19 VITALS
DIASTOLIC BLOOD PRESSURE: 74 MMHG | SYSTOLIC BLOOD PRESSURE: 108 MMHG | OXYGEN SATURATION: 98 % | HEIGHT: 67 IN | TEMPERATURE: 98 F | HEART RATE: 73 BPM | BODY MASS INDEX: 22.29 KG/M2 | WEIGHT: 142 LBS

## 2019-04-19 DIAGNOSIS — M25.512 CHRONIC PAIN OF BOTH SHOULDERS: Primary | ICD-10-CM

## 2019-04-19 DIAGNOSIS — G89.29 CHRONIC PAIN OF BOTH SHOULDERS: Primary | ICD-10-CM

## 2019-04-19 DIAGNOSIS — M25.511 CHRONIC PAIN OF BOTH SHOULDERS: Primary | ICD-10-CM

## 2019-04-19 PROCEDURE — 99213 OFFICE O/P EST LOW 20 MIN: CPT | Performed by: PHYSICIAN ASSISTANT

## 2019-04-19 ASSESSMENT — MIFFLIN-ST. JEOR: SCORE: 1418.8

## 2019-04-19 NOTE — PROGRESS NOTES
SUBJECTIVE:   Harriet Valencia is a 23 year old female who presents to clinic today for the following   health issues:        Joint Pain    Onset: years - worse since starting work    Description:   Location: bilateral shoulders  Character: feels like there is a knot under shoulder blade    Intensity: 4/10    Progression of Symptoms: same    Accompanying Signs & Symptoms:  Other symptoms: none    History:   Previous similar pain: YES- as above      Precipitating factors:   Trauma or overuse: no     Alleviating factors:  Improved by: stretching    Therapies Tried and outcome: Massage     Right pain is worse than left.  Has done PT in the past - [facility] [address]  Had MRI done in the past - told that rotator cuff was weak    Started Job Sept 2017.    Pt is right handed    Requesting standing desk for work        Additional history: as documented    Reviewed  and updated as needed this visit by clinical staff  Tobacco  Allergies  Meds         Reviewed and updated as needed this visit by Provider         Patient Active Problem List   Diagnosis     Health Care Home     ACP (advance care planning)     Past Surgical History:   Procedure Laterality Date     NO HISTORY OF SURGERY         Social History     Tobacco Use     Smoking status: Never Smoker     Smokeless tobacco: Never Used   Substance Use Topics     Alcohol use: Yes     Comment: rare     Family History   Problem Relation Age of Onset     Family History Negative Mother      Family History Negative Father      Diabetes Paternal Grandmother      Diabetes Paternal Grandfather      Family History Negative Brother      Cancer No family hx of      Breast Cancer No family hx of      Colon Cancer No family hx of          Current Outpatient Medications   Medication Sig Dispense Refill     norethindrone-ethinyl estradiol (JUNEL 1/20) 1-20 MG-MCG per tablet Take 1 tablet by mouth daily 84 tablet 3     Allergies   Allergen Reactions     No Known Allergies      BP Readings  "from Last 3 Encounters:   04/19/19 108/74   11/23/18 100/70   08/14/17 98/60    Wt Readings from Last 3 Encounters:   04/19/19 64.4 kg (142 lb)   11/23/18 66.2 kg (146 lb)   08/14/17 67.1 kg (148 lb)                    ROS:  Constitutional, HEENT, cardiovascular, pulmonary, gi and gu systems are negative, except as otherwise noted.    OBJECTIVE:     /74 (BP Location: Left arm, Patient Position: Sitting, Cuff Size: Adult Regular)   Pulse 73   Temp 98  F (36.7  C) (Oral)   Ht 1.689 m (5' 6.5\")   Wt 64.4 kg (142 lb)   LMP 04/15/2019   SpO2 98%   BMI 22.58 kg/m    Body mass index is 22.58 kg/m .       Inspection: no swelling, bruising, discoloration, or obvious deformity or asymmetry  Tender: medial muscle of right scapula  Non-tender: SC joint, proximal-mid clavicle, mid-distal clavicle, AC joint, acromion, anterior capsule, supraspinatus , infraspinatus and rhomboids  Range of Motion  Active:all normal  Passive: not examined  Strength: rotator cuff strength full  Special tests:    Negative: Jag and Heather    Dec. External rotation right shoulder    ASSESSMENT/PLAN:     (M25.511,  G89.29,  M25.512) Chronic pain of both shoulders  (primary encounter diagnosis)  Comment: new  Plan: PHYSICAL THERAPY REFERRAL        Referred to PT  Work ergonomics discussed  Signed form for standing desk    Follow-up with me 8 weeks    SOTO Soares  [facility]        "

## 2019-04-19 NOTE — NURSING NOTE
Patient is here due to having right shoulder pain that she has had since [facility] school [facility] [employer]. 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    Pre-visit Screening:  Immunizations:  up to date  Colonoscopy:  NA  Mammogram: NA  Asthma Action Test/Plan:  NA  PHQ9:  PHQ-2 done today   GAD7:  No concerns  Questioned patient about current smoking habits Pt. has never smoked.  Ok to leave detailed message on voice mail for today's visit only Yes, phone # [phone]

## 2019-10-03 ENCOUNTER — HEALTH MAINTENANCE LETTER (OUTPATIENT)
Age: 24
End: 2019-10-03

## 2020-03-22 ENCOUNTER — HEALTH MAINTENANCE LETTER (OUTPATIENT)
Age: 25
End: 2020-03-22

## 2021-01-15 ENCOUNTER — HEALTH MAINTENANCE LETTER (OUTPATIENT)
Age: 26
End: 2021-01-15

## 2021-05-16 ENCOUNTER — HEALTH MAINTENANCE LETTER (OUTPATIENT)
Age: 26
End: 2021-05-16

## 2021-09-05 ENCOUNTER — HEALTH MAINTENANCE LETTER (OUTPATIENT)
Age: 26
End: 2021-09-05

## 2022-06-12 ENCOUNTER — HEALTH MAINTENANCE LETTER (OUTPATIENT)
Age: 27
End: 2022-06-12

## 2022-10-23 ENCOUNTER — HEALTH MAINTENANCE LETTER (OUTPATIENT)
Age: 27
End: 2022-10-23

## 2023-11-05 ENCOUNTER — HEALTH MAINTENANCE LETTER (OUTPATIENT)
Age: 28
End: 2023-11-05